# Patient Record
Sex: FEMALE | Race: ASIAN | NOT HISPANIC OR LATINO
[De-identification: names, ages, dates, MRNs, and addresses within clinical notes are randomized per-mention and may not be internally consistent; named-entity substitution may affect disease eponyms.]

---

## 2017-07-19 ENCOUNTER — APPOINTMENT (OUTPATIENT)
Dept: OBGYN | Facility: HOSPITAL | Age: 27
End: 2017-07-19

## 2017-07-19 ENCOUNTER — OUTPATIENT (OUTPATIENT)
Dept: OUTPATIENT SERVICES | Facility: HOSPITAL | Age: 27
LOS: 1 days | End: 2017-07-19

## 2017-07-19 ENCOUNTER — RESULT CHARGE (OUTPATIENT)
Age: 27
End: 2017-07-19

## 2017-07-19 VITALS
HEART RATE: 59 BPM | WEIGHT: 159 LBS | DIASTOLIC BLOOD PRESSURE: 80 MMHG | BODY MASS INDEX: 27.14 KG/M2 | HEIGHT: 64 IN | SYSTOLIC BLOOD PRESSURE: 124 MMHG

## 2017-07-19 DIAGNOSIS — Z82.49 FAMILY HISTORY OF ISCHEMIC HEART DISEASE AND OTHER DISEASES OF THE CIRCULATORY SYSTEM: ICD-10-CM

## 2017-07-19 DIAGNOSIS — Z30.09 ENCOUNTER FOR OTHER GENERAL COUNSELING AND ADVICE ON CONTRACEPTION: ICD-10-CM

## 2017-07-19 DIAGNOSIS — Z78.9 OTHER SPECIFIED HEALTH STATUS: ICD-10-CM

## 2017-07-19 DIAGNOSIS — Z30.430 ENCOUNTER FOR INSERTION OF INTRAUTERINE CONTRACEPTIVE DEVICE: ICD-10-CM

## 2017-07-19 LAB
HCG UR QL: NEGATIVE
QUALITY CONTROL: YES

## 2017-08-18 ENCOUNTER — APPOINTMENT (OUTPATIENT)
Dept: OBGYN | Facility: HOSPITAL | Age: 27
End: 2017-08-18

## 2017-08-18 ENCOUNTER — OUTPATIENT (OUTPATIENT)
Dept: OUTPATIENT SERVICES | Facility: HOSPITAL | Age: 27
LOS: 1 days | End: 2017-08-18
Payer: MEDICAID

## 2017-08-18 VITALS
DIASTOLIC BLOOD PRESSURE: 85 MMHG | HEART RATE: 67 BPM | SYSTOLIC BLOOD PRESSURE: 129 MMHG | BODY MASS INDEX: 27.66 KG/M2 | WEIGHT: 161.13 LBS

## 2017-08-18 PROCEDURE — 76830 TRANSVAGINAL US NON-OB: CPT | Mod: 26

## 2017-08-21 DIAGNOSIS — Z97.5 PRESENCE OF (INTRAUTERINE) CONTRACEPTIVE DEVICE: ICD-10-CM

## 2017-08-30 ENCOUNTER — APPOINTMENT (OUTPATIENT)
Dept: OBGYN | Facility: HOSPITAL | Age: 27
End: 2017-08-30

## 2017-09-06 ENCOUNTER — APPOINTMENT (OUTPATIENT)
Dept: OBGYN | Facility: HOSPITAL | Age: 27
End: 2017-09-06

## 2020-08-18 ENCOUNTER — APPOINTMENT (OUTPATIENT)
Dept: ENDOCRINOLOGY | Facility: CLINIC | Age: 30
End: 2020-08-18
Payer: COMMERCIAL

## 2020-08-18 VITALS
DIASTOLIC BLOOD PRESSURE: 82 MMHG | HEART RATE: 70 BPM | SYSTOLIC BLOOD PRESSURE: 115 MMHG | OXYGEN SATURATION: 99 % | BODY MASS INDEX: 26.46 KG/M2 | HEIGHT: 64 IN | WEIGHT: 155 LBS | TEMPERATURE: 97.3 F

## 2020-08-18 PROCEDURE — 36415 COLL VENOUS BLD VENIPUNCTURE: CPT

## 2020-08-18 PROCEDURE — 99204 OFFICE O/P NEW MOD 45 MIN: CPT | Mod: 25

## 2020-08-18 NOTE — HISTORY OF PRESENT ILLNESS
[FreeTextEntry1] : Ms. AN  is a 30 year  year old female  who presents for initial endocrine evaluation. She presents with regard to a history of elevated Free T4.  Pt showed us recent tft's from June and too from February with free T4 at 1.27 with upper limit t 1.12. with tsh at  1.15. With latest Free T4 at 1.5 and tsh at 1.83 T3 uptake in June was 36-wnl. (These labs were on patients phone and she will e mail to me)\par Additional medical history includes that of  hypertension and vitamin d deficiency. Is on Losartan 25 mg daily. Takes D3 1,000 iu and fiber pills and cholestoff-drinks collagen and whey protein.\par Feels well\par Mom with hx of hyperthyroidism\par Trouble  with dropping weight over past one or so year.\par Works out and eats very healthy.\par Menses regular\par Did just receive  a new iud-hormonal type.Recent thyroid US neg.\par Saw endo several months ago-no clear etiology noted for the elevated free T4.\par

## 2020-08-18 NOTE — PHYSICAL EXAM
[Well Nourished] : well nourished [Alert] : alert [No Acute Distress] : no acute distress [Well Developed] : well developed [Normal Sclera/Conjunctiva] : normal sclera/conjunctiva [EOMI] : extra ocular movement intact [No Proptosis] : no proptosis [Normal Oropharynx] : the oropharynx was normal [Thyroid Not Enlarged] : the thyroid was not enlarged [No Thyroid Nodules] : no palpable thyroid nodules [No Respiratory Distress] : no respiratory distress [No Accessory Muscle Use] : no accessory muscle use [Clear to Auscultation] : lungs were clear to auscultation bilaterally [Normal S1, S2] : normal S1 and S2 [Normal Rate] : heart rate was normal [Regular Rhythm] : with a regular rhythm [No Edema] : no peripheral edema [Pedal Pulses Normal] : the pedal pulses are present [Normal Bowel Sounds] : normal bowel sounds [Not Tender] : non-tender [Not Distended] : not distended [Soft] : abdomen soft [Normal Posterior Cervical Nodes] : no posterior cervical lymphadenopathy [Normal Anterior Cervical Nodes] : no anterior cervical lymphadenopathy [No Spinal Tenderness] : no spinal tenderness [Spine Straight] : spine straight [No Stigmata of Cushings Syndrome] : no stigmata of Cushings Syndrome [Normal Gait] : normal gait [Normal Strength/Tone] : muscle strength and tone were normal [No Rash] : no rash [Normal Reflexes] : deep tendon reflexes were 2+ and symmetric [Oriented x3] : oriented to person, place, and time [No Tremors] : no tremors [Acanthosis Nigricans] : no acanthosis nigricans

## 2020-08-25 LAB
T3FREE SERPL-MCNC: 2.61 PG/ML
T4 FREE SERPL-MCNC: 1.4 NG/DL
T4BG SERPL-MCNC: 19 UG/ML
THYROGLOB AB SERPL-ACNC: <20 IU/ML
THYROPEROXIDASE AB SERPL IA-ACNC: 39.1 IU/ML
TSH RECEPTOR AB: <1.1 IU/L
TSH SERPL-ACNC: 1.53 UIU/ML
TSI ACT/NOR SER: <0.1 IU/L

## 2020-11-24 ENCOUNTER — APPOINTMENT (OUTPATIENT)
Dept: ENDOCRINOLOGY | Facility: CLINIC | Age: 30
End: 2020-11-24
Payer: COMMERCIAL

## 2020-11-24 VITALS
RESPIRATION RATE: 16 BRPM | SYSTOLIC BLOOD PRESSURE: 118 MMHG | TEMPERATURE: 98.6 F | HEIGHT: 64 IN | BODY MASS INDEX: 27.31 KG/M2 | WEIGHT: 160 LBS | DIASTOLIC BLOOD PRESSURE: 72 MMHG | OXYGEN SATURATION: 99 % | HEART RATE: 73 BPM

## 2020-11-24 DIAGNOSIS — I10 ESSENTIAL (PRIMARY) HYPERTENSION: ICD-10-CM

## 2020-11-24 PROCEDURE — 99214 OFFICE O/P EST MOD 30 MIN: CPT

## 2020-11-24 NOTE — HISTORY OF PRESENT ILLNESS
[FreeTextEntry1] : Ms. AN  is a 30 year  year old female  who returns for endocrine follow up with regard to a  history of elevated Free T4.  TFT"Sfrom June  of this year with free T4 at 1.27 with upper limit t 1.12. with tsh at  1.15. With latest Free T4 at 1.5  injFebruary-normal and tsh at 1.83 T3 uptake in June was 36-wnl. (These labs were on patients phone and she was to have e mailed-I will request again. Repeat lab tewting from 08/18 showed Adis T4 at 1.4 with normal Free T3 too normal TSH at value of 1.5.\par Additional medical history includes that of  hypertension and vitamin d deficiency. Is on Losartan 25 mg daily. Takes D3 1,000 iu and fiber pills and cholestoff-drinks collagen and whey protein.\par Labs from Augsut with very minimal elevation TPO ab at 39 with upepr limit of 34.9 and TG ab neg.\par Feels well\par Mom with hx of hyperthyroidism\par Trouble  with dropping weight over past one or so year.\par Works out and eats very healthy.\par Menses regular\par Does have  iud-hormonal type.Recent thyroid US neg.\par Cant drop weight  eats very well and [id number] daily.\par \par

## 2021-01-20 ENCOUNTER — NON-APPOINTMENT (OUTPATIENT)
Age: 31
End: 2021-01-20

## 2021-01-21 ENCOUNTER — NON-APPOINTMENT (OUTPATIENT)
Age: 31
End: 2021-01-21

## 2021-03-19 ENCOUNTER — APPOINTMENT (OUTPATIENT)
Dept: ENDOCRINOLOGY | Facility: CLINIC | Age: 31
End: 2021-03-19
Payer: COMMERCIAL

## 2021-03-19 VITALS
DIASTOLIC BLOOD PRESSURE: 75 MMHG | SYSTOLIC BLOOD PRESSURE: 115 MMHG | HEIGHT: 64 IN | OXYGEN SATURATION: 99 % | BODY MASS INDEX: 27.66 KG/M2 | HEART RATE: 71 BPM | TEMPERATURE: 98.5 F | WEIGHT: 162 LBS

## 2021-03-19 DIAGNOSIS — E55.9 VITAMIN D DEFICIENCY, UNSPECIFIED: ICD-10-CM

## 2021-03-19 DIAGNOSIS — R94.6 ABNORMAL RESULTS OF THYROID FUNCTION STUDIES: ICD-10-CM

## 2021-03-19 DIAGNOSIS — I10 ESSENTIAL (PRIMARY) HYPERTENSION: ICD-10-CM

## 2021-03-19 DIAGNOSIS — E06.3 AUTOIMMUNE THYROIDITIS: ICD-10-CM

## 2021-03-19 PROCEDURE — 76536 US EXAM OF HEAD AND NECK: CPT

## 2021-03-19 PROCEDURE — 99072 ADDL SUPL MATRL&STAF TM PHE: CPT

## 2021-03-19 PROCEDURE — 99214 OFFICE O/P EST MOD 30 MIN: CPT

## 2021-03-19 PROCEDURE — XXXXX: CPT

## 2021-03-19 RX ORDER — TRIAMTERENE AND HYDROCHLOROTHIAZIDE 25; 37.5 MG/1; MG/1
37.5-25 TABLET ORAL DAILY
Refills: 0 | Status: DISCONTINUED | COMMUNITY
Start: 2017-07-19 | End: 2021-03-19

## 2021-03-19 RX ORDER — LOSARTAN POTASSIUM 50 MG/1
50 TABLET, FILM COATED ORAL
Qty: 30 | Refills: 0 | Status: ACTIVE | COMMUNITY
Start: 2021-03-19

## 2021-03-22 PROBLEM — E55.9 VITAMIN D DEFICIENCY: Status: ACTIVE | Noted: 2020-08-18

## 2021-03-22 PROBLEM — E06.3 HASHIMOTO'S DISEASE: Status: ACTIVE | Noted: 2020-11-24

## 2021-03-22 PROBLEM — R94.6 ABNORMAL THYROID FUNCTION TEST: Status: ACTIVE | Noted: 2020-08-18

## 2021-03-22 NOTE — HISTORY OF PRESENT ILLNESS
[FreeTextEntry1] : Ms. AN  is a 31  year old female  who returns for endocrine follow up She did have elevated Free T4 on one occasion which upon repeqat -was wnl.\par Additional medical history includes that of  hypertension and vitamin d deficiency. \par Taking vitamin b12, vitamin d3 2,000 IU,  losartan 50 mg. and fiber pills and cholestoff-drinks collagen and whey protein.\par Labs from last  August with very minimal elevation TPO ab at 39 with upper limit of 34.9 and TG ab neg.\par Feels well\par Trouble  with dropping weight over past one or so year. eats very well and exercises daily.\par Works out and eats very healthy.\par Menses regular\par Does have  iud-hormonal type.Recent thyroid US neg.\par \par \par Thyroid U/S from 11/29/2019 showed:0.27 x 0.23 cm rt sided nodule\par \par Did not tolerate berbrine well - vomiting and nausea \par Exercises frequently, however unable to drop weight, uses [employer] bike \par will be going for liposuction soon with Dr. Reed on 04/06/2021\par advised to f/u rheumatologist for unexplained htn by plastic surgeon, htn has been thoroughly evaluated by PCP \par FMHx: denies hx of weight gain, mother had hyperthyroidism, father had HTN

## 2021-04-15 NOTE — PROCEDURE
[[employer] e 2008 model, 10-12 MHz frequencies] : multiple real time longitudinal and transverse images were obtained using a high resolution ultrasound with a linear transducer, [employer] e 2008 model, 10-12 MHz frequencies. All measurements will be reported as longitudinal x abdirahman-posterior x transverse. [Thyroid Nodule] : thyroid nodule [] : a heterogeneous parenchyma [Right Thyroid] : right [Lower] : lower pole there is a  [Isoechoic w/ hypoechoic foci] : isoechoic, with an internal hypoechoic foci nodule [Round] : round in shape [Regular] : regular [No] : does not have a halo [Macrocalcifications] : macrocalcifications [No vascularity] : no vascularity [FreeTextEntry1] : 3.40 x 1.64 x 1.91  [FreeTextEntry5] : 2.70 x 1.38 x 1.64  [FreeTextEntry3] : 0.47 x 0.37 x 0.69  [FreeTextEntry2] : 0.53

## 2021-04-15 NOTE — PROCEDURE
[[email or web address] e 2008 model, 10-12 MHz frequencies] : multiple real time longitudinal and transverse images were obtained using a high resolution ultrasound with a linear transducer, [email or web address] e 2008 model, 10-12 MHz frequencies. All measurements will be reported as longitudinal x abdirahman-posterior x transverse. [Thyroid Nodule] : thyroid nodule [] : a heterogeneous parenchyma [Right Thyroid] : right [Lower] : lower pole there is a  [Isoechoic w/ hypoechoic foci] : isoechoic, with an internal hypoechoic foci nodule [Round] : round in shape [Regular] : regular [No] : does not have a halo [Macrocalcifications] : macrocalcifications [No vascularity] : no vascularity [FreeTextEntry1] : 3.40 x 1.64 x 1.91  [FreeTextEntry5] : 2.70 x 1.38 x 1.64  [FreeTextEntry3] : 0.47 x 0.37 x 0.69  [FreeTextEntry2] : 0.53

## 2021-04-15 NOTE — PROCEDURE
[[employer] e 2008 model, 10-12 MHz frequencies] : multiple real time longitudinal and transverse images were obtained using a high resolution ultrasound with a linear transducer, [employer] e 2008 model, 10-12 MHz frequencies. All measurements will be reported as longitudinal x abdirahman-posterior x transverse. [Thyroid Nodule] : thyroid nodule [] : a heterogeneous parenchyma [Right Thyroid] : right [Lower] : lower pole there is a  [Isoechoic w/ hypoechoic foci] : isoechoic, with an internal hypoechoic foci nodule [Round] : round in shape [Regular] : regular [No] : does not have a halo [Macrocalcifications] : macrocalcifications [No vascularity] : no vascularity [FreeTextEntry1] : 3.40 x 1.64 x 1.91  [FreeTextEntry5] : 2.70 x 1.38 x 1.64  [FreeTextEntry3] : 0.47 x 0.37 x 0.69  [FreeTextEntry2] : 0.53